# Patient Record
Sex: MALE | URBAN - METROPOLITAN AREA
[De-identification: names, ages, dates, MRNs, and addresses within clinical notes are randomized per-mention and may not be internally consistent; named-entity substitution may affect disease eponyms.]

---

## 2017-03-16 ENCOUNTER — IMPORTED ENCOUNTER (OUTPATIENT)
Dept: URBAN - METROPOLITAN AREA CLINIC 50 | Facility: CLINIC | Age: 62
End: 2017-03-16

## 2017-03-21 ENCOUNTER — IMPORTED ENCOUNTER (OUTPATIENT)
Dept: URBAN - METROPOLITAN AREA CLINIC 50 | Facility: CLINIC | Age: 62
End: 2017-03-21

## 2017-04-06 ENCOUNTER — IMPORTED ENCOUNTER (OUTPATIENT)
Dept: URBAN - METROPOLITAN AREA CLINIC 50 | Facility: CLINIC | Age: 62
End: 2017-04-06

## 2017-04-27 ENCOUNTER — IMPORTED ENCOUNTER (OUTPATIENT)
Dept: URBAN - METROPOLITAN AREA CLINIC 50 | Facility: CLINIC | Age: 62
End: 2017-04-27

## 2017-10-31 ENCOUNTER — IMPORTED ENCOUNTER (OUTPATIENT)
Dept: URBAN - METROPOLITAN AREA CLINIC 50 | Facility: CLINIC | Age: 62
End: 2017-10-31

## 2018-04-12 ENCOUNTER — IMPORTED ENCOUNTER (OUTPATIENT)
Dept: URBAN - METROPOLITAN AREA CLINIC 50 | Facility: CLINIC | Age: 63
End: 2018-04-12

## 2018-05-01 ENCOUNTER — IMPORTED ENCOUNTER (OUTPATIENT)
Dept: URBAN - METROPOLITAN AREA CLINIC 50 | Facility: CLINIC | Age: 63
End: 2018-05-01

## 2018-05-08 ENCOUNTER — IMPORTED ENCOUNTER (OUTPATIENT)
Dept: URBAN - METROPOLITAN AREA CLINIC 50 | Facility: CLINIC | Age: 63
End: 2018-05-08

## 2018-05-30 ENCOUNTER — IMPORTED ENCOUNTER (OUTPATIENT)
Dept: URBAN - METROPOLITAN AREA CLINIC 50 | Facility: CLINIC | Age: 63
End: 2018-05-30

## 2018-06-14 ENCOUNTER — IMPORTED ENCOUNTER (OUTPATIENT)
Dept: URBAN - METROPOLITAN AREA CLINIC 50 | Facility: CLINIC | Age: 63
End: 2018-06-14

## 2018-06-26 ENCOUNTER — IMPORTED ENCOUNTER (OUTPATIENT)
Dept: URBAN - METROPOLITAN AREA CLINIC 50 | Facility: CLINIC | Age: 63
End: 2018-06-26

## 2018-07-03 ENCOUNTER — IMPORTED ENCOUNTER (OUTPATIENT)
Dept: URBAN - METROPOLITAN AREA CLINIC 50 | Facility: CLINIC | Age: 63
End: 2018-07-03

## 2018-12-27 ENCOUNTER — IMPORTED ENCOUNTER (OUTPATIENT)
Dept: URBAN - METROPOLITAN AREA CLINIC 50 | Facility: CLINIC | Age: 63
End: 2018-12-27

## 2019-01-14 ENCOUNTER — IMPORTED ENCOUNTER (OUTPATIENT)
Dept: URBAN - METROPOLITAN AREA CLINIC 50 | Facility: CLINIC | Age: 64
End: 2019-01-14

## 2019-06-03 ENCOUNTER — IMPORTED ENCOUNTER (OUTPATIENT)
Dept: URBAN - METROPOLITAN AREA CLINIC 50 | Facility: CLINIC | Age: 64
End: 2019-06-03

## 2019-06-06 ENCOUNTER — IMPORTED ENCOUNTER (OUTPATIENT)
Dept: URBAN - METROPOLITAN AREA CLINIC 50 | Facility: CLINIC | Age: 64
End: 2019-06-06

## 2020-06-24 ENCOUNTER — IMPORTED ENCOUNTER (OUTPATIENT)
Dept: URBAN - METROPOLITAN AREA CLINIC 50 | Facility: CLINIC | Age: 65
End: 2020-06-24

## 2020-09-09 ENCOUNTER — IMPORTED ENCOUNTER (OUTPATIENT)
Dept: URBAN - METROPOLITAN AREA CLINIC 50 | Facility: CLINIC | Age: 65
End: 2020-09-09

## 2021-04-23 ASSESSMENT — TONOMETRY
OS_IOP_MMHG: 18
OS_IOP_MMHG: 16
OD_IOP_MMHG: 17
OS_IOP_MMHG: 16
OD_IOP_MMHG: 16
OS_IOP_MMHG: 18
OD_IOP_MMHG: 16
OD_IOP_MMHG: 22
OD_IOP_MMHG: 23
OS_IOP_MMHG: 17
OS_IOP_MMHG: 18
OS_IOP_MMHG: 18
OS_IOP_MMHG: 20
OD_IOP_MMHG: 20
OD_IOP_MMHG: 18
OD_IOP_MMHG: 21

## 2021-04-23 ASSESSMENT — PACHYMETRY
OD_CT_UM: 549
OS_CT_UM: 552
OD_CT_UM: 549
OD_CT_UM: 549
OS_CT_UM: 552
OD_CT_UM: 549
OS_CT_UM: 552
OS_CT_UM: 552
OD_CT_UM: 549
OS_CT_UM: 552
OD_CT_UM: 549
OD_CT_UM: 549
OS_CT_UM: 552
OD_CT_UM: 549
OS_CT_UM: 552
OD_CT_UM: 549
OS_CT_UM: 552
OD_CT_UM: 549
OS_CT_UM: 552
OD_CT_UM: 549

## 2021-04-23 ASSESSMENT — VISUAL ACUITY
OS_BAT: 20/25
OS_OTHER: 20/25. 20/30.
OD_CC: J1@ 18 IN
OD_CC: 20/20
OD_CC: 20/20
OS_CC: J1@ 18 IN
OD_BAT: 20/40
OD_CC: 20/20
OS_CC: 20/20
OS_CC: 20/20
OS_CC: J1+
OS_CC: 20/20-
OD_CC: 20/20
OD_BAT: 20/25
OD_OTHER: 20/20.
OS_CC: J1+
OS_BAT: 20/40
OD_CC: 20/20
OD_CC: J1+
OD_OTHER: 20/25. 20/30.
OS_CC: 20/20
OS_OTHER: 20/40. 20/60.
OS_CC: J1+
OD_OTHER: 20/40. 20/50.
OD_CC: J1+
OS_CC: 20/20
OD_CC: J1+
OS_CC: 20/20
OS_PH: @ 18 IN
OD_CC: 20/20
OD_CC: J1+
OS_OTHER: 20/25.
OD_CC: 20/20
OS_CC: 20/20
OS_CC: J1+
OS_CC: 20/20
OD_CC: 20/20-
OD_PH: @ 18 IN

## 2021-07-22 ENCOUNTER — COMPREHENSIVE EXAM (OUTPATIENT)
Dept: URBAN - METROPOLITAN AREA CLINIC 48 | Facility: CLINIC | Age: 66
End: 2021-07-22

## 2021-07-22 DIAGNOSIS — H52.13: ICD-10-CM

## 2021-07-22 DIAGNOSIS — H40.013: ICD-10-CM

## 2021-07-22 DIAGNOSIS — Z01.01: ICD-10-CM

## 2021-07-22 PROCEDURE — 92133 CPTRZD OPH DX IMG PST SGM ON: CPT

## 2021-07-22 PROCEDURE — 92015 DETERMINE REFRACTIVE STATE: CPT

## 2021-07-22 PROCEDURE — CLF EW NO CL FIT, EW, NO ADJUSTMENT

## 2021-07-22 PROCEDURE — 92014 COMPRE OPH EXAM EST PT 1/>: CPT

## 2021-07-22 ASSESSMENT — VISUAL ACUITY
OU_CC: 20/20
OD_SC: J1+ @ 10"
OD_CC: 20/20
OS_CC: 20/25-2
OU_SC: J1+@10"

## 2021-07-22 ASSESSMENT — TONOMETRY
OS_IOP_MMHG: 19
OD_IOP_MMHG: 19

## 2021-07-22 NOTE — PATIENT DISCUSSION
Operculated hole w/ surrounding pigment inf/temp OD. Patient asymptomatic. Patient educated on signs/symptoms of RD and to rtc STAT if these ensue. Monitor.

## 2021-07-22 NOTE — PATIENT DISCUSSION
Per increased c/d ratio, OS>OD. IOP wnl, 19/19. OCT(RNFL) done today stable to 2017 OD/OS. Will continue to monitor.

## 2022-08-15 ENCOUNTER — COMPREHENSIVE EXAM (OUTPATIENT)
Dept: URBAN - METROPOLITAN AREA CLINIC 48 | Facility: LOCATION | Age: 67
End: 2022-08-15

## 2022-08-15 DIAGNOSIS — H40.013: ICD-10-CM

## 2022-08-15 DIAGNOSIS — Z01.01: ICD-10-CM

## 2022-08-15 DIAGNOSIS — H52.13: ICD-10-CM

## 2022-08-15 DIAGNOSIS — H25.13: ICD-10-CM

## 2022-08-15 PROCEDURE — 92015 DETERMINE REFRACTIVE STATE: CPT

## 2022-08-15 PROCEDURE — 92310-2E ESTABLISHED CL PATIENT TORIC (ASTIGMATISM) SINGLE VISION SOFT LENS EVALUATION

## 2022-08-15 PROCEDURE — 92014 COMPRE OPH EXAM EST PT 1/>: CPT

## 2022-08-15 ASSESSMENT — VISUAL ACUITY
OS_CC: 20/20
OS_CC: 20/20
OU_CC: J1+ @16IN
OD_GLARE: 20/40-1
OS_GLARE: 20/50
OD_GLARE: 20/50
OD_CC: 20/20
OS_GLARE: 20/70
OD_CC: 20/20

## 2022-08-15 ASSESSMENT — TONOMETRY
OD_IOP_MMHG: 20
OS_IOP_MMHG: 20

## 2022-08-15 NOTE — PATIENT DISCUSSION
Per increased c/d ratio, OS>OD. IOP 20/20; last visit19/19. OCT(RNFL) done 07/2021 stable to 2017 OD/OS. Will have patient RTC for OCT(RNFL)/HVF.

## 2022-12-27 ENCOUNTER — DIAGNOSTICS ONLY (OUTPATIENT)
Dept: URBAN - METROPOLITAN AREA CLINIC 48 | Facility: LOCATION | Age: 67
End: 2022-12-27

## 2022-12-27 PROCEDURE — 92083 EXTENDED VISUAL FIELD XM: CPT

## 2022-12-27 PROCEDURE — 92133 CPTRZD OPH DX IMG PST SGM ON: CPT

## 2022-12-27 NOTE — PATIENT DISCUSSION
Per increased c/d ratio, OS>OD. IOP 20/20; last visit 19/19. OCT RNFL (12/27/2022) stable OD/OS. HVF (12/27/2022) abnormal OD and wnl OS. RTC next available repeat HVF OD at no charge.

## 2023-09-07 ENCOUNTER — COMPREHENSIVE EXAM (OUTPATIENT)
Dept: URBAN - METROPOLITAN AREA CLINIC 48 | Facility: LOCATION | Age: 68
End: 2023-09-07

## 2023-09-07 DIAGNOSIS — D31.32: ICD-10-CM

## 2023-09-07 DIAGNOSIS — H43.813: ICD-10-CM

## 2023-09-07 DIAGNOSIS — H25.13: ICD-10-CM

## 2023-09-07 DIAGNOSIS — H40.013: ICD-10-CM

## 2023-09-07 DIAGNOSIS — Z97.3: ICD-10-CM

## 2023-09-07 DIAGNOSIS — H52.13: ICD-10-CM

## 2023-09-07 DIAGNOSIS — Z01.01: ICD-10-CM

## 2023-09-07 DIAGNOSIS — H33.321: ICD-10-CM

## 2023-09-07 PROCEDURE — 92014 COMPRE OPH EXAM EST PT 1/>: CPT

## 2023-09-07 PROCEDURE — 92015 DETERMINE REFRACTIVE STATE: CPT

## 2023-09-07 PROCEDURE — 92310-2E ESTABLISHED CL PATIENT TORIC (ASTIGMATISM) SINGLE VISION SOFT LENS EVALUATION

## 2023-09-07 ASSESSMENT — KERATOMETRY
OS_K1POWER_DIOPTERS: 46.25
OS_K2POWER_DIOPTERS: 44.50
OD_AXISANGLE_DEGREES: 170
OD_K1POWER_DIOPTERS: 46.25
OD_K2POWER_DIOPTERS: 45.00
OD_AXISANGLE2_DEGREES: 80
OS_AXISANGLE2_DEGREES: 85
OS_AXISANGLE_DEGREES: 175

## 2023-09-07 ASSESSMENT — VISUAL ACUITY
OS_CC: 20/20
OD_GLARE: 20/25
OS_GLARE: 20/25
OS_CC: 20/20
OD_CC: 20/20
OD_GLARE: 20/25
OS_GLARE: 20/25
OU_CC: J1+
OD_CC: 20/20

## 2023-09-07 ASSESSMENT — TONOMETRY
OD_IOP_MMHG: 19
OS_IOP_MMHG: 19

## 2024-11-05 ENCOUNTER — COMPREHENSIVE EXAM (OUTPATIENT)
Dept: URBAN - METROPOLITAN AREA CLINIC 53 | Facility: CLINIC | Age: 69
End: 2024-11-05

## 2024-11-05 DIAGNOSIS — H40.013: ICD-10-CM

## 2024-11-05 DIAGNOSIS — H52.4: ICD-10-CM

## 2024-11-05 DIAGNOSIS — Z01.01: ICD-10-CM

## 2024-11-05 DIAGNOSIS — H43.813: ICD-10-CM

## 2024-11-05 DIAGNOSIS — H33.321: ICD-10-CM

## 2024-11-05 DIAGNOSIS — Z97.3: ICD-10-CM

## 2024-11-05 DIAGNOSIS — H25.13: ICD-10-CM

## 2024-11-05 PROCEDURE — 92310-1 LEVEL 1 SOFT LENS UPDATE

## 2024-11-05 PROCEDURE — 92015 DETERMINE REFRACTIVE STATE: CPT

## 2024-11-05 PROCEDURE — 92014 COMPRE OPH EXAM EST PT 1/>: CPT

## 2025-05-20 ENCOUNTER — DIAGNOSTICS ONLY (OUTPATIENT)
Age: 70
End: 2025-05-20

## 2025-05-20 DIAGNOSIS — H40.013: ICD-10-CM

## 2025-05-20 PROCEDURE — 92133 CPTRZD OPH DX IMG PST SGM ON: CPT

## 2025-05-20 PROCEDURE — 92083 EXTENDED VISUAL FIELD XM: CPT
